# Patient Record
Sex: FEMALE | Race: WHITE | Employment: OTHER | URBAN - METROPOLITAN AREA
[De-identification: names, ages, dates, MRNs, and addresses within clinical notes are randomized per-mention and may not be internally consistent; named-entity substitution may affect disease eponyms.]

---

## 2017-05-21 ENCOUNTER — TRANSFERRED RECORDS (OUTPATIENT)
Dept: HEALTH INFORMATION MANAGEMENT | Facility: CLINIC | Age: 72
End: 2017-05-21

## 2017-06-10 ENCOUNTER — HEALTH MAINTENANCE LETTER (OUTPATIENT)
Age: 72
End: 2017-06-10

## 2018-06-16 ENCOUNTER — HEALTH MAINTENANCE LETTER (OUTPATIENT)
Age: 73
End: 2018-06-16

## 2020-05-26 ENCOUNTER — TRANSFERRED RECORDS (OUTPATIENT)
Dept: HEALTH INFORMATION MANAGEMENT | Facility: CLINIC | Age: 75
End: 2020-05-26

## 2020-07-02 ENCOUNTER — TRANSFERRED RECORDS (OUTPATIENT)
Dept: HEALTH INFORMATION MANAGEMENT | Facility: CLINIC | Age: 75
End: 2020-07-02

## 2020-07-23 ENCOUNTER — MEDICAL CORRESPONDENCE (OUTPATIENT)
Dept: HEALTH INFORMATION MANAGEMENT | Facility: CLINIC | Age: 75
End: 2020-07-23

## 2020-08-03 ENCOUNTER — TRANSCRIBE ORDERS (OUTPATIENT)
Dept: OTHER | Age: 75
End: 2020-08-03

## 2020-08-03 DIAGNOSIS — G47.33 OSA (OBSTRUCTIVE SLEEP APNEA): Primary | ICD-10-CM

## 2020-08-04 ENCOUNTER — TELEPHONE (OUTPATIENT)
Dept: OTOLARYNGOLOGY | Facility: CLINIC | Age: 75
End: 2020-08-04

## 2020-08-04 NOTE — TELEPHONE ENCOUNTER
[facility] Call Center    Phone Message    May a detailed message be left on voicemail: yes     Reason for Call: Other: Pt is referred by Dr Con Coleman at [facility] to Dr. Conteh for Inspire Device. Records and referral sent to clinic for review, thanks!     Action Taken: Message routed to:  [facility] ([facility] ENT    Travel Screening: Not Applicable

## 2020-08-05 NOTE — TELEPHONE ENCOUNTER
Call patient to schedule:    Appointment Type - VIDEO VISIT NEW / TELEPHONE VISIT NEW  Provider - Dr. Conteh  Appointment Notes - Inspire Consult, referred by Dr. Con Coleman ([facility])    LVM with scheduling instructions and the ENT call center number. Patient is welcome to schedule video or telephone visit in the next available 'NEW' slot.

## 2021-01-04 ENCOUNTER — TELEPHONE (OUTPATIENT)
Dept: OTOLARYNGOLOGY | Facility: CLINIC | Age: 76
End: 2021-01-04

## 2021-01-04 NOTE — TELEPHONE ENCOUNTER
Returned phone call to pt, explained that it is ok to have sleep study as it is scheduled and that Dr Conteh will need to see the report following the study. Pt verbalized understanding of plan. She requests [employer] access, email sent to set up account. Fide Barbour RN

## 2021-01-04 NOTE — TELEPHONE ENCOUNTER
[facility] Call Center    Phone Message    May a detailed message be left on voicemail: yes     Reason for Call: The patient called to ask if it was ok her sleep study is going to be done at [facility] in [place] where the patient lives? Please advise. Thank you.    Action Taken: Message routed to:  Adult Clinics: ENT p [id number]    Travel Screening: Not Applicable

## 2021-01-15 ENCOUNTER — HEALTH MAINTENANCE LETTER (OUTPATIENT)
Age: 76
End: 2021-01-15

## 2021-02-13 ENCOUNTER — TRANSFERRED RECORDS (OUTPATIENT)
Dept: HEALTH INFORMATION MANAGEMENT | Facility: CLINIC | Age: 76
End: 2021-02-13

## 2021-03-01 ENCOUNTER — OFFICE VISIT (OUTPATIENT)
Dept: OTOLARYNGOLOGY | Facility: CLINIC | Age: 76
End: 2021-03-01
Attending: INTERNAL MEDICINE
Payer: COMMERCIAL

## 2021-03-01 VITALS — HEART RATE: 70 BPM | OXYGEN SATURATION: 98 % | SYSTOLIC BLOOD PRESSURE: 159 MMHG | DIASTOLIC BLOOD PRESSURE: 93 MMHG

## 2021-03-01 DIAGNOSIS — R06.83 SNORING: Primary | ICD-10-CM

## 2021-03-01 PROCEDURE — 99202 OFFICE O/P NEW SF 15 MIN: CPT | Performed by: OTOLARYNGOLOGY

## 2021-03-01 RX ORDER — PREDNISOLONE ACETATE 10 MG/ML
SUSPENSION/ DROPS OPHTHALMIC
COMMUNITY
Start: 2020-12-29

## 2021-03-01 RX ORDER — ESTRADIOL 0.5 MG/1
TABLET ORAL
COMMUNITY
Start: 2020-11-10

## 2021-03-01 RX ORDER — IPRATROPIUM BROMIDE 21 UG/1
2 SPRAY, METERED NASAL
COMMUNITY
Start: 2019-06-03

## 2021-03-01 RX ORDER — ESCITALOPRAM OXALATE 10 MG/1
10 TABLET ORAL
COMMUNITY
Start: 2020-11-22 | End: 2021-11-22

## 2021-03-01 RX ORDER — LOSARTAN POTASSIUM 50 MG/1
50 TABLET ORAL
COMMUNITY
Start: 2020-11-22 | End: 2021-11-22

## 2021-03-01 RX ORDER — OXYBUTYNIN CHLORIDE 10 MG/1
10 TABLET, EXTENDED RELEASE ORAL
COMMUNITY
Start: 2020-11-22 | End: 2021-11-22

## 2021-03-01 RX ORDER — RIFAXIMIN 550 MG/1
TABLET ORAL
COMMUNITY
Start: 2021-02-19

## 2021-03-01 NOTE — NURSING NOTE
Mireya Villeda's goals for this visit include:   Chief Complaint   Patient presents with     Sleep Apnea     CHELSEY, has CPAP, but doesn't like using CPAP. Would like to discuss options.        She requests these members of her care team be copied on today's visit information: yes    PCP: Roni Sanderson    Referring Provider:  Con Coleman MD  [facility]  [address] [place] [address]    BP (!) 159/93   Pulse 70   SpO2 98%     Do you need any medication refills at today's visit? none

## 2021-03-01 NOTE — PROGRESS NOTES
SLEEP SURGERY CONSULTATION    Patient: Mireya Villeda  : 1945  CHIEF COMPLAINT: CHELSEY    HPI:  Mireya Villeda is a 75 year old year old female.  Patient reports that she was diagnosed with obstructive sleep apnea approximately 5 years ago.  She believes her AHI was 22 at that time.  She has been using CPAP since that time.  While the CPAP has been helpful for things like morning headaches, snoring, choking at night, she finds it to be difficult to travel with.  It causes extremely dry nose especially during winter.  When she goes to [place] because of the immediate does tend to give her a rash on the cheeks.  She wanted to see if she might be a candidate for inspire.  She did have a repeat sleep study performed on 2021.  This was done at [facility].  Overall AHI was 3.4 with a lowest oxygen saturation of 85%.  She spoke with her sleep physician who discussed the results of the sleep study with her.  It appears that she no longer has sleep apnea.  She is wondering if there were any other options for her.      PAST MEDICAL HISTORY:  Past Medical History:   Diagnosis Date     Acute, but ill-defined, cerebrovascular disease     CVA, fwith coma, heart surgery after coma     Ulcerative colitis, unspecified     crohn's hx of resection       PAST SURGICAL HISTORY:  Past Surgical History:   Procedure Laterality Date     [facility] NONSPECIFIC PROCEDURE      s/p Open heart surgery 2nd to stroke, atrial myxoma     [facility] NONSPECIFIC PROCEDURE      s/p back surgery     [facility] NONSPECIFIC PROCEDURE      CAMILLE/BSO, ventral hernia repair       MEDICATIONS:  Current Outpatient Medications   Medication Sig Dispense Refill     CALCIUM 600 + D 600-200 MG-UNIT OR TABS   0     CLIMARA 0.0375 MG/24HR TD PTWK 1 PATCH WEEKLY 8 x 4     denosumab (PROLIA) 60 MG/ML SOSY injection Inject 60 mg Subcutaneous       escitalopram (LEXAPRO) 10 MG tablet Take 10 mg by mouth       estradiol (ESTRACE) 0.5 MG tablet         ipratropium (ATROVENT) 0.03 % nasal spray Spray 2 sprays in nostril       losartan (COZAAR) 50 MG tablet Take 50 mg by mouth       oxybutynin ER (DITROPAN-XL) 10 MG 24 hr tablet Take 10 mg by mouth       prednisoLONE acetate (PRED FORTE) 1 % ophthalmic suspension        XIFAXAN 550 MG TABS tablet          ALLERGIES:  Allergies   Allergen Reactions     No Known Drug Allergies        SOCIAL HISTORY:  Social History     Socioeconomic History     Marital status:      Spouse name: Not on file     Number of children: Not on file     Years of education: Not on file     Highest education level: Not on file   Occupational History     Not on file   Social Needs     Financial resource strain: Not on file     Food insecurity     Worry: Not on file     Inability: Not on file     Transportation needs     Medical: Not on file     Non-medical: Not on file   Tobacco Use     Smoking status: Never Smoker   Substance and Sexual Activity     Alcohol use: Yes     Comment: occ     Drug use: No     Sexual activity: Not Currently     Partners: Male     Comment: one partner in last year   Lifestyle     Physical activity     Days per week: Not on file     Minutes per session: Not on file     Stress: Not on file   Relationships     Social connections     Talks on phone: Not on file     Gets together: Not on file     Attends Rastafarian service: Not on file     Active member of club or organization: Not on file     Attends meetings of clubs or organizations: Not on file     Relationship status: Not on file     Intimate partner violence     Fear of current or ex partner: Not on file     Emotionally abused: Not on file     Physically abused: Not on file     Forced sexual activity: Not on file   Other Topics Concern      Service No     Blood Transfusions Yes     Comment: with open heart surgery     Caffeine Concern No     Occupational Exposure No     Hobby Hazards No     Sleep Concern No     Stress Concern No     Weight Concern No      Special Diet Yes     Comment: weight watchers     Back Care Yes     Comment: spinal stenosis     Exercise Yes     Comment: daily     Bike Helmet Yes     Seat Belt Yes     Self-Exams Yes   Social History Narrative    Caffeine intake/servings daily - 3    Calcium intake/servings daily - 2    Exercise 3-4 times weekly - describe walking, cardio    Sunscreen used - Yes    Seatbelts used - Yes    Guns stored in the home - No    Self Breast Exam - Yes    Pap test up to date -  Yes    Eye exam up to date -  Yes    Dental exam up to date -  Yes    DEXA scan up to date -  Yes    Flex Sig/Colonoscopy up to date -  Yes    Mammography up to date -  No    Immunizations reviewed and up to date - Yes    Abuse: Current or Past (Physical, Sexual or Emotional) - No    Do you feel safe in your environment - Yes    Do you cope well with stress - Yes    Do you suffer from insomnia - sometimes    Last updated by: Toyin Dominique  5/20/2009       FAMILY HISTORY:  Family History   Problem Relation Age of Onset     Diabetes Maternal Grandmother      Eye Disorder Mother        REVIEW OF SYSTEMS:  No flowsheet data found.      PHYSICAL EXAM  BP (!) 159/93   Pulse 70   SpO2 98%     Constitutional: healthy, alert and no distress    Assessment and plan:  According to the patient's most recent sleep study she does not have significant sleep apnea anymore.  She would appear to have primary snoring.  I discussed with her her options are she can continue with CPAP she does find it to be helpful but if there are times where she would like to go without CPAP, I think it would be reasonable to do so.  For instance when she is traveling, she may not have to necessarily travel with her CPAP machine.  For her nasal dryness I would recommend nasal saline spray.  At this time based off of her sleep study I would recommend no surgery is necessary for her.  She may return to see us as needed.    I spent a total of 15 minutes total time towards today's visit.   Time spent included seeing and evaluating Mireya Villeda during today's office visit, which included counseling the patient.  Time was also spent reviewing records/tests;  independently reviewing results and communicating results to the patient; and documenting clinical information in the electronic health record.

## 2021-10-24 ENCOUNTER — HEALTH MAINTENANCE LETTER (OUTPATIENT)
Age: 76
End: 2021-10-24

## 2022-02-13 ENCOUNTER — HEALTH MAINTENANCE LETTER (OUTPATIENT)
Age: 77
End: 2022-02-13

## 2022-10-15 ENCOUNTER — HEALTH MAINTENANCE LETTER (OUTPATIENT)
Age: 77
End: 2022-10-15

## 2023-01-01 ENCOUNTER — HEALTH MAINTENANCE LETTER (OUTPATIENT)
Age: 78
End: 2023-01-01

## 2023-01-01 ENCOUNTER — TRANSCRIBE ORDERS (OUTPATIENT)
Dept: OTHER | Age: 78
End: 2023-01-01

## 2023-01-01 ENCOUNTER — PATIENT OUTREACH (OUTPATIENT)
Dept: SURGERY | Facility: CLINIC | Age: 78
End: 2023-01-01
Payer: COMMERCIAL

## 2023-01-01 DIAGNOSIS — C25.9 PANCREATIC CANCER (H): Primary | ICD-10-CM

## 2023-05-30 NOTE — PROGRESS NOTES
Surgical Oncology RN Care Coordination Note:     Called and spoke with patient regarding second opinion for treatment of pancreas cancer. Informed her that Dr. Subramanian doesn't see pancreas cancer but if she is interested in a second opinion at the  we can arrange for a consult with our medical oncology team if she would like. At this time she doesn't feel that is needed and will call back if she changes her mind. Patient confirmed she is established at [facility] and [facility] for care at this time.     Tiera Dickerson, RN, BSN  Care Coordinator